# Patient Record
Sex: FEMALE | URBAN - NONMETROPOLITAN AREA
[De-identification: names, ages, dates, MRNs, and addresses within clinical notes are randomized per-mention and may not be internally consistent; named-entity substitution may affect disease eponyms.]

---

## 2017-10-04 ENCOUNTER — APPOINTMENT (RX ONLY)
Dept: URBAN - NONMETROPOLITAN AREA CLINIC 22 | Facility: CLINIC | Age: 77
Setting detail: DERMATOLOGY
End: 2017-10-04

## 2017-10-04 DIAGNOSIS — L92.0 GRANULOMA ANNULARE: ICD-10-CM

## 2017-10-04 PROCEDURE — 11900 INJECT SKIN LESIONS </W 7: CPT

## 2017-10-04 PROCEDURE — ? INTRALESIONAL KENALOG

## 2017-10-04 PROCEDURE — ? TREATMENT REGIMEN

## 2017-10-04 PROCEDURE — ? COUNSELING

## 2017-10-04 ASSESSMENT — LOCATION SIMPLE DESCRIPTION DERM: LOCATION SIMPLE: RIGHT FOREARM

## 2017-10-04 ASSESSMENT — LOCATION ZONE DERM: LOCATION ZONE: ARM

## 2017-10-04 ASSESSMENT — LOCATION DETAILED DESCRIPTION DERM: LOCATION DETAILED: RIGHT VENTRAL LATERAL DISTAL FOREARM

## 2017-10-04 NOTE — HPI: EVALUATION OF SKIN LESION(S)
Hpi Title: Evaluation of a Skin Lesion
How Severe Are Your Spot(S)?: mild
Have Your Spot(S) Been Treated In The Past?: has not been treated
Additional History: Patient's  states he has been applied antibiotic ointment, campho phenique, and desonide without improvement.

## 2017-10-04 NOTE — PROCEDURE: TREATMENT REGIMEN
Plan: Discussed diagnosis and treatment options with patient and her  at this time. Discussed steroid injection or topical steroids as treatment at this time. Patient would like to proceed with steroid injection at this time
Detail Level: Zone

## 2017-10-04 NOTE — PROCEDURE: INTRALESIONAL KENALOG
Concentration Of Kenalog Solution Injected (Mg/Ml): 5.0
Include Z78.9 (Other Specified Conditions Influencing Health Status) As An Associated Diagnosis?: No
Medical Necessity Clause: This procedure was medically necessary because the lesions that were treated were:
Lot # For Kenalog (Optional): [id number]
Administered By (Optional): Kirstie Daniel DO
Detail Level: Zone
X Size Of Lesion In Cm (Optional): 0
Treatment Number (Optional): 1
Expiration Date For Kenalog (Optional): Jan2019
Size Of Lesion (Optional): 1.5
Kenalog Preparation: Kenalog
Consent: The risks of atrophy were reviewed with the patient.
Total Volume (Ccs): 3.5